# Patient Record
Sex: FEMALE | Race: OTHER | HISPANIC OR LATINO
[De-identification: names, ages, dates, MRNs, and addresses within clinical notes are randomized per-mention and may not be internally consistent; named-entity substitution may affect disease eponyms.]

---

## 2017-01-18 ENCOUNTER — RX RENEWAL (OUTPATIENT)
Age: 42
End: 2017-01-18

## 2017-03-16 ENCOUNTER — TRANSCRIPTION ENCOUNTER (OUTPATIENT)
Age: 42
End: 2017-03-16

## 2017-03-18 ENCOUNTER — TRANSCRIPTION ENCOUNTER (OUTPATIENT)
Age: 42
End: 2017-03-18

## 2017-03-22 ENCOUNTER — EMERGENCY (EMERGENCY)
Facility: HOSPITAL | Age: 42
LOS: 1 days | Discharge: HOME | End: 2017-03-22
Attending: EMERGENCY MEDICINE | Admitting: EMERGENCY MEDICINE
Payer: SELF-PAY

## 2017-03-22 VITALS
HEART RATE: 101 BPM | SYSTOLIC BLOOD PRESSURE: 184 MMHG | HEIGHT: 67 IN | OXYGEN SATURATION: 98 % | DIASTOLIC BLOOD PRESSURE: 103 MMHG | TEMPERATURE: 98 F | RESPIRATION RATE: 18 BRPM | WEIGHT: 184.97 LBS

## 2017-03-22 DIAGNOSIS — R05 COUGH: ICD-10-CM

## 2017-03-22 PROCEDURE — 71046 X-RAY EXAM CHEST 2 VIEWS: CPT

## 2017-03-22 PROCEDURE — 99284 EMERGENCY DEPT VISIT MOD MDM: CPT

## 2017-03-22 PROCEDURE — 71020: CPT | Mod: 26

## 2017-03-22 PROCEDURE — 99283 EMERGENCY DEPT VISIT LOW MDM: CPT | Mod: 25

## 2017-03-22 PROCEDURE — 94640 AIRWAY INHALATION TREATMENT: CPT

## 2017-03-22 RX ORDER — IPRATROPIUM/ALBUTEROL SULFATE 18-103MCG
3 AEROSOL WITH ADAPTER (GRAM) INHALATION ONCE
Qty: 0 | Refills: 0 | Status: COMPLETED | OUTPATIENT
Start: 2017-03-22 | End: 2017-03-22

## 2017-03-22 RX ADMIN — Medication 3 MILLILITER(S): at 12:14

## 2017-03-22 NOTE — ED PROVIDER NOTE - MEDICAL DECISION MAKING DETAILS
40 YO F p/w productive cough x 2 months, chest congestion but afebrile, Imm UTD, no recent travel, no red flags, possible post nasal drip vs poorly controlled bronchitis vs unlikely pertussis. Plan: duoneb, cxr, reassess and discharge. 42 YO F p/w productive cough x 2 months, chest congestion but afebrile, Imm UTD, no recent travel, no red flags, possible post nasal drip vs poorly controlled bronchitis vs unlikely pertussis. Plan: duoneb, cxr, reassess and discharge.       Attending note. Agree with the above. Chest x-ray is normal. Trial of albuterol. Follow up with allergist.

## 2017-03-22 NOTE — ED PROVIDER NOTE - OBJECTIVE STATEMENT
Pt is a 40 YO F w/ hx of anxiety and chronic urticaria, HTN p/w intermittent cough x 2 months, initially beginning w/ URI like symptoms of subj fevers and chills, nasal congestion, and violent coughing fits x 3- 4 times a day. Most symptoms have resolved but c/w persistent white clearish sputum productive cough, mild chest congestion but no SOB, no CP, no back pain, no SCHUMACHER, no swelling of LE. Pt has been seen by allergist w/ negative PFT, urgent care x 2 w/ prednisone w/ transient relief of symptoms, no other work-up. No previous episodes, has extensive smoking hx, no other worsening or relieving factors. Pt is a 42 YO F w/ hx of anxiety and chronic urticaria, HTN p/w intermittent cough x 2 months, initially beginning w/ URI like symptoms of subj fevers and chills, nasal congestion, and violent coughing fits x 3- 4 times a day. Most symptoms have resolved but c/w persistent white clearish sputum productive cough, mild chest congestion but no SOB, no CP, no back pain, no SCHUMACHER, no swelling of LE. Pt has been seen by allergist w/ negative PFT, urgent care x 2 w/ prednisone w/ transient relief of symptoms, no other work-up. No previous episodes, has extensive smoking hx, no other worsening or relieving factors.      Attending note. Patient was seen in the fast [facility]. Agree with above. Patient is complaining of a nonproductive cough for 2-3 months. Patient is complaining of some chest congestion but denies shortness of breath. Patient was seen by urgent care center and provided with a prescription for Flonase. Patient was subtly seen by her allergist who prescribed steroid. They also recommended she use a albuterol inhaler but patient was afraid to use it, because of concerns for tachycardia.  Patient was seen by urgent care and again 5 days ago where she was prescribed a Z-Rad. Patient has not been using the nasal spray or inhaler. Patient still occasionally smokes cigarettes. She denies any history of pneumonia or childhood asthma.

## 2017-03-22 NOTE — ED PROVIDER NOTE - PHYSICAL EXAMINATION
Attending note. Patient is alert and oriented in no acute distress. Oropharynx is normal. Neck is soft and nontender without adenopathy. Lungs are clear with good air entry bilaterally without wheezes, rales or rhonchi. Patient did not cough in my presence.

## 2017-03-22 NOTE — ED ADULT NURSE NOTE - OBJECTIVE STATEMENT
40 y/o female presents to ED after having persistent cough for 2 months. Pt had been placed on steroids and an antibiotic with no relief. Brings up clear sputum. Denies chest pain, SOB, N/V/D, fever or chills. A&Ox4, VSS, skin warm dry and intact, lungs CTA, MAEx4, ABD soft nondistended. Awaits further evaluation from MD.

## 2017-03-22 NOTE — ED PROVIDER NOTE - PROGRESS NOTE DETAILS
1300 - x-ray negative for acute PNA, will give f/u w/ pulm and to return w/ allergist, will discharge.

## 2017-03-30 ENCOUNTER — APPOINTMENT (OUTPATIENT)
Dept: INTERNAL MEDICINE | Facility: CLINIC | Age: 42
End: 2017-03-30

## 2017-03-30 ENCOUNTER — OUTPATIENT (OUTPATIENT)
Dept: OUTPATIENT SERVICES | Facility: HOSPITAL | Age: 42
LOS: 1 days | End: 2017-03-30
Payer: SELF-PAY

## 2017-03-30 VITALS — BODY MASS INDEX: 30.45 KG/M2 | HEIGHT: 67 IN | WEIGHT: 194 LBS | HEART RATE: 105 BPM

## 2017-03-30 DIAGNOSIS — I10 ESSENTIAL (PRIMARY) HYPERTENSION: ICD-10-CM

## 2017-03-30 PROCEDURE — G0463: CPT

## 2017-03-30 RX ORDER — ALPRAZOLAM 0.25 MG/1
0.25 TABLET ORAL
Qty: 20 | Refills: 0 | Status: ACTIVE | COMMUNITY
Start: 2017-03-30 | End: 1900-01-01

## 2017-03-31 DIAGNOSIS — F41.9 ANXIETY DISORDER, UNSPECIFIED: ICD-10-CM

## 2017-03-31 DIAGNOSIS — Z00.00 ENCOUNTER FOR GENERAL ADULT MEDICAL EXAMINATION WITHOUT ABNORMAL FINDINGS: ICD-10-CM

## 2017-04-05 ENCOUNTER — APPOINTMENT (OUTPATIENT)
Dept: OBGYN | Facility: CLINIC | Age: 42
End: 2017-04-05

## 2017-04-05 VITALS — HEIGHT: 67 IN | BODY MASS INDEX: 30.13 KG/M2 | WEIGHT: 192 LBS

## 2017-04-05 VITALS — SYSTOLIC BLOOD PRESSURE: 168 MMHG | DIASTOLIC BLOOD PRESSURE: 95 MMHG

## 2017-04-05 DIAGNOSIS — T81.9XXA UNSPECIFIED COMPLICATION OF PROCEDURE, INITIAL ENCOUNTER: ICD-10-CM

## 2017-04-05 DIAGNOSIS — Z87.891 PERSONAL HISTORY OF NICOTINE DEPENDENCE: ICD-10-CM

## 2017-04-05 DIAGNOSIS — Z80.49 FAMILY HISTORY OF MALIGNANT NEOPLASM OF OTHER GENITAL ORGANS: ICD-10-CM

## 2017-04-05 DIAGNOSIS — Z01.419 ENCOUNTER FOR GYNECOLOGICAL EXAMINATION (GENERAL) (ROUTINE) W/OUT ABNORMAL FINDINGS: ICD-10-CM

## 2017-04-05 DIAGNOSIS — L29.2 PRURITUS VULVAE: ICD-10-CM

## 2017-04-05 DIAGNOSIS — L50.8 OTHER URTICARIA: ICD-10-CM

## 2017-04-05 RX ORDER — CYCLOBENZAPRINE HYDROCHLORIDE 5 MG/1
5 TABLET, FILM COATED ORAL
Refills: 0 | Status: ACTIVE | COMMUNITY
Start: 2017-04-05

## 2017-04-06 LAB
CANDIDA VAG CYTO: NOT DETECTED
G VAGINALIS+PREV SP MTYP VAG QL MICRO: NOT DETECTED
T VAGINALIS VAG QL WET PREP: NOT DETECTED

## 2017-04-07 LAB — HPV HIGH+LOW RISK DNA PNL CVX: NEGATIVE

## 2017-04-11 LAB — CYTOLOGY CVX/VAG DOC THIN PREP: NORMAL

## 2017-04-25 ENCOUNTER — MEDICATION RENEWAL (OUTPATIENT)
Age: 42
End: 2017-04-25

## 2017-05-02 ENCOUNTER — APPOINTMENT (OUTPATIENT)
Dept: INTERNAL MEDICINE | Facility: CLINIC | Age: 42
End: 2017-05-02

## 2017-05-12 ENCOUNTER — APPOINTMENT (OUTPATIENT)
Dept: PULMONOLOGY | Facility: CLINIC | Age: 42
End: 2017-05-12

## 2017-05-17 ENCOUNTER — MEDICATION RENEWAL (OUTPATIENT)
Age: 42
End: 2017-05-17

## 2017-05-18 ENCOUNTER — TRANSCRIPTION ENCOUNTER (OUTPATIENT)
Age: 42
End: 2017-05-18

## 2017-05-22 ENCOUNTER — RX RENEWAL (OUTPATIENT)
Age: 42
End: 2017-05-22

## 2017-06-30 ENCOUNTER — APPOINTMENT (OUTPATIENT)
Dept: PULMONOLOGY | Facility: CLINIC | Age: 42
End: 2017-06-30

## 2017-07-06 ENCOUNTER — LABORATORY RESULT (OUTPATIENT)
Age: 42
End: 2017-07-06

## 2017-07-06 ENCOUNTER — OUTPATIENT (OUTPATIENT)
Dept: OUTPATIENT SERVICES | Facility: HOSPITAL | Age: 42
LOS: 1 days | End: 2017-07-06
Payer: SELF-PAY

## 2017-07-06 PROCEDURE — 83036 HEMOGLOBIN GLYCOSYLATED A1C: CPT

## 2017-07-06 PROCEDURE — 80061 LIPID PANEL: CPT

## 2017-07-06 PROCEDURE — 85027 COMPLETE CBC AUTOMATED: CPT

## 2017-07-06 PROCEDURE — 80053 COMPREHEN METABOLIC PANEL: CPT

## 2017-07-06 PROCEDURE — 84443 ASSAY THYROID STIM HORMONE: CPT

## 2017-07-07 DIAGNOSIS — I10 ESSENTIAL (PRIMARY) HYPERTENSION: ICD-10-CM

## 2017-07-07 DIAGNOSIS — F41.9 ANXIETY DISORDER, UNSPECIFIED: ICD-10-CM

## 2017-07-07 DIAGNOSIS — Z00.00 ENCOUNTER FOR GENERAL ADULT MEDICAL EXAMINATION WITHOUT ABNORMAL FINDINGS: ICD-10-CM

## 2017-07-07 LAB
ALBUMIN SERPL ELPH-MCNC: 4.4 G/DL — SIGNIFICANT CHANGE UP (ref 3.3–5)
ALP SERPL-CCNC: 105 U/L — SIGNIFICANT CHANGE UP (ref 40–120)
ALT FLD-CCNC: 16 U/L — SIGNIFICANT CHANGE UP (ref 10–45)
ANION GAP SERPL CALC-SCNC: 16 MMOL/L — SIGNIFICANT CHANGE UP (ref 5–17)
AST SERPL-CCNC: 16 U/L — SIGNIFICANT CHANGE UP (ref 10–40)
BILIRUB SERPL-MCNC: 0.4 MG/DL — SIGNIFICANT CHANGE UP (ref 0.2–1.2)
BUN SERPL-MCNC: 13 MG/DL — SIGNIFICANT CHANGE UP (ref 7–23)
CALCIUM SERPL-MCNC: 9.6 MG/DL — SIGNIFICANT CHANGE UP (ref 8.4–10.5)
CHLORIDE SERPL-SCNC: 102 MMOL/L — SIGNIFICANT CHANGE UP (ref 96–108)
CHOLEST SERPL-MCNC: 184 MG/DL — SIGNIFICANT CHANGE UP (ref 10–199)
CO2 SERPL-SCNC: 23 MMOL/L — SIGNIFICANT CHANGE UP (ref 22–31)
CREAT SERPL-MCNC: 0.93 MG/DL — SIGNIFICANT CHANGE UP (ref 0.5–1.3)
GLUCOSE SERPL-MCNC: 76 MG/DL — SIGNIFICANT CHANGE UP (ref 70–99)
HBA1C BLD-MCNC: 5.2 % — SIGNIFICANT CHANGE UP (ref 4–5.6)
HCT VFR BLD CALC: 47.4 % — HIGH (ref 34.5–45)
HDLC SERPL-MCNC: 44 MG/DL — SIGNIFICANT CHANGE UP (ref 40–125)
HGB BLD-MCNC: 15.2 G/DL — SIGNIFICANT CHANGE UP (ref 11.5–15.5)
LIPID PNL WITH DIRECT LDL SERPL: 91 MG/DL — SIGNIFICANT CHANGE UP
MCHC RBC-ENTMCNC: 31.1 PG — SIGNIFICANT CHANGE UP (ref 27–34)
MCHC RBC-ENTMCNC: 32.1 GM/DL — SIGNIFICANT CHANGE UP (ref 32–36)
MCV RBC AUTO: 96.9 FL — SIGNIFICANT CHANGE UP (ref 80–100)
PLATELET # BLD AUTO: 269 K/UL — SIGNIFICANT CHANGE UP (ref 150–400)
POTASSIUM SERPL-MCNC: 4.4 MMOL/L — SIGNIFICANT CHANGE UP (ref 3.5–5.3)
POTASSIUM SERPL-SCNC: 4.4 MMOL/L — SIGNIFICANT CHANGE UP (ref 3.5–5.3)
PROT SERPL-MCNC: 7.9 G/DL — SIGNIFICANT CHANGE UP (ref 6–8.3)
RBC # BLD: 4.89 M/UL — SIGNIFICANT CHANGE UP (ref 3.8–5.2)
RBC # FLD: 13.3 % — SIGNIFICANT CHANGE UP (ref 10.3–14.5)
SODIUM SERPL-SCNC: 141 MMOL/L — SIGNIFICANT CHANGE UP (ref 135–145)
T4 FREE+ TSH PNL SERPL: 1.38 UIU/ML — SIGNIFICANT CHANGE UP (ref 0.27–4.2)
TOTAL CHOLESTEROL/HDL RATIO MEASUREMENT: 4.2 RATIO — SIGNIFICANT CHANGE UP (ref 3.3–7.1)
TRIGL SERPL-MCNC: 243 MG/DL — HIGH (ref 10–149)
WBC # BLD: 10.73 K/UL — HIGH (ref 3.8–10.5)
WBC # FLD AUTO: 10.73 K/UL — HIGH (ref 3.8–10.5)

## 2017-07-26 ENCOUNTER — APPOINTMENT (OUTPATIENT)
Dept: OBGYN | Facility: CLINIC | Age: 42
End: 2017-07-26
Payer: COMMERCIAL

## 2017-07-26 VITALS
DIASTOLIC BLOOD PRESSURE: 90 MMHG | SYSTOLIC BLOOD PRESSURE: 160 MMHG | BODY MASS INDEX: 28.88 KG/M2 | HEIGHT: 67 IN | WEIGHT: 184 LBS

## 2017-07-26 DIAGNOSIS — N94.89 OTHER SPECIFIED CONDITIONS ASSOCIATED WITH FEMALE GENITAL ORGANS AND MENSTRUAL CYCLE: ICD-10-CM

## 2017-07-26 PROCEDURE — 76857 US EXAM PELVIC LIMITED: CPT

## 2017-07-26 PROCEDURE — 99214 OFFICE O/P EST MOD 30 MIN: CPT | Mod: 25

## 2017-07-26 RX ORDER — ASCORBIC ACID, CHOLECALCIFEROL, .ALPHA.-TOCOPHEROL ACETATE, DL-, PYRIDOXINE, FOLIC ACID, CYANOCOBALAMIN, CALCIUM, FERROUS FUMARATE, MAGNESIUM, DOCONEXENT 85; 200; 10; 25; 1; 12; 140; 27; 45; 300 [IU]/1; [IU]/1; [IU]/1; [IU]/1; MG/1; UG/1; MG/1; MG/1; MG/1; MG/1
27-0.6-0.4-3 CAPSULE, GELATIN COATED ORAL
Qty: 90 | Refills: 3 | Status: ACTIVE | COMMUNITY
Start: 2017-07-26 | End: 1900-01-01

## 2017-07-26 RX ORDER — DOXYLAMINE SUCCINATE AND PYRIDOXINE HYDROCHLORIDE 10; 10 MG/1; MG/1
10-10 TABLET, DELAYED RELEASE ORAL
Qty: 100 | Refills: 2 | Status: ACTIVE | COMMUNITY
Start: 2017-07-26 | End: 1900-01-01

## 2017-07-27 ENCOUNTER — LABORATORY RESULT (OUTPATIENT)
Age: 42
End: 2017-07-27

## 2017-07-28 LAB
C TRACH RRNA SPEC QL NAA+PROBE: NORMAL
HCG SERPL-MCNC: 3110 MIU/ML
N GONORRHOEA RRNA SPEC QL NAA+PROBE: NORMAL
PROGEST SERPL-MCNC: 5.3 NG/ML
SOURCE AMPLIFICATION: NORMAL

## 2017-08-03 DIAGNOSIS — Z3A.01 LESS THAN 8 WEEKS GESTATION OF PREGNANCY: ICD-10-CM

## 2017-08-10 PROBLEM — Z3A.01 LESS THAN 8 WEEKS GESTATION OF PREGNANCY: Status: ACTIVE | Noted: 2017-08-10

## 2017-08-16 DIAGNOSIS — E34.9 ENDOCRINE DISORDER, UNSPECIFIED: ICD-10-CM

## 2017-08-16 LAB — HCG SERPL-MCNC: 5134 MIU/ML

## 2017-08-17 PROBLEM — E34.9 ELEVATED SERUM HCG: Status: ACTIVE | Noted: 2017-08-15

## 2017-08-19 LAB — HCG SERPL-MCNC: 4538 MIU/ML

## 2017-08-25 LAB — HCG SERPL-MCNC: 71 MIU/ML

## 2017-09-06 LAB — HCG SERPL-MCNC: 1 MIU/ML

## 2017-09-13 ENCOUNTER — OTHER (OUTPATIENT)
Age: 42
End: 2017-09-13

## 2017-09-13 ENCOUNTER — TRANSCRIPTION ENCOUNTER (OUTPATIENT)
Age: 42
End: 2017-09-13

## 2017-09-13 DIAGNOSIS — R05 COUGH: ICD-10-CM

## 2017-09-13 RX ORDER — FLUTICASONE PROPIONATE 50 UG/1
50 SPRAY, METERED NASAL DAILY
Qty: 1 | Refills: 0 | Status: ACTIVE | COMMUNITY
Start: 2017-04-05 | End: 1900-01-01

## 2017-09-15 ENCOUNTER — OUTPATIENT (OUTPATIENT)
Dept: OUTPATIENT SERVICES | Facility: HOSPITAL | Age: 42
LOS: 1 days | End: 2017-09-15
Payer: SELF-PAY

## 2017-09-15 ENCOUNTER — APPOINTMENT (OUTPATIENT)
Dept: INTERNAL MEDICINE | Facility: CLINIC | Age: 42
End: 2017-09-15
Payer: COMMERCIAL

## 2017-09-15 VITALS — HEIGHT: 67 IN | WEIGHT: 187 LBS | BODY MASS INDEX: 29.35 KG/M2 | HEART RATE: 98 BPM | OXYGEN SATURATION: 98 %

## 2017-09-15 DIAGNOSIS — F41.9 ANXIETY DISORDER, UNSPECIFIED: ICD-10-CM

## 2017-09-15 DIAGNOSIS — I10 ESSENTIAL (PRIMARY) HYPERTENSION: ICD-10-CM

## 2017-09-15 DIAGNOSIS — Z87.09 PERSONAL HISTORY OF OTHER DISEASES OF THE RESPIRATORY SYSTEM: ICD-10-CM

## 2017-09-15 DIAGNOSIS — J06.9 ACUTE UPPER RESPIRATORY INFECTION, UNSPECIFIED: ICD-10-CM

## 2017-09-15 PROCEDURE — G0463: CPT

## 2017-09-15 PROCEDURE — 99213 OFFICE O/P EST LOW 20 MIN: CPT | Mod: GE

## 2017-09-17 RX ORDER — ALPRAZOLAM 0.25 MG/1
0.25 TABLET ORAL
Qty: 20 | Refills: 0 | Status: ACTIVE | COMMUNITY
Start: 2017-09-15 | End: 1900-01-01

## 2017-09-21 ENCOUNTER — APPOINTMENT (OUTPATIENT)
Dept: OBGYN | Facility: CLINIC | Age: 42
End: 2017-09-21

## 2017-10-11 ENCOUNTER — APPOINTMENT (OUTPATIENT)
Dept: OBGYN | Facility: CLINIC | Age: 42
End: 2017-10-11

## 2017-10-13 ENCOUNTER — MEDICATION RENEWAL (OUTPATIENT)
Age: 42
End: 2017-10-13

## 2017-10-13 RX ORDER — BENZONATATE 100 MG/1
100 CAPSULE ORAL
Qty: 40 | Refills: 0 | Status: ACTIVE | COMMUNITY
Start: 2017-05-18

## 2017-10-16 ENCOUNTER — MEDICATION RENEWAL (OUTPATIENT)
Age: 42
End: 2017-10-16

## 2017-10-19 ENCOUNTER — APPOINTMENT (OUTPATIENT)
Dept: OBGYN | Facility: CLINIC | Age: 42
End: 2017-10-19
Payer: COMMERCIAL

## 2017-10-19 VITALS — WEIGHT: 183 LBS | HEIGHT: 67 IN | BODY MASS INDEX: 28.72 KG/M2

## 2017-10-19 DIAGNOSIS — O03.9 COMPLETE OR UNSPECIFIED SPONTANEOUS ABORTION W/OUT COMPLICATION: ICD-10-CM

## 2017-10-19 DIAGNOSIS — Z00.00 ENCOUNTER FOR GENERAL ADULT MEDICAL EXAMINATION W/OUT ABNORMAL FINDINGS: ICD-10-CM

## 2017-10-19 LAB — HCG UR QL: NEGATIVE

## 2017-10-19 PROCEDURE — 99213 OFFICE O/P EST LOW 20 MIN: CPT

## 2017-10-19 PROCEDURE — 81025 URINE PREGNANCY TEST: CPT

## 2017-11-10 ENCOUNTER — TRANSCRIPTION ENCOUNTER (OUTPATIENT)
Age: 42
End: 2017-11-10

## 2018-02-03 ENCOUNTER — RX RENEWAL (OUTPATIENT)
Age: 43
End: 2018-02-03

## 2018-03-15 RX ORDER — BECLOMETHASONE DIPROPIONATE 80 UG/1
80 AEROSOL, METERED RESPIRATORY (INHALATION) TWICE DAILY
Qty: 1 | Refills: 1 | Status: ACTIVE | COMMUNITY
Start: 2017-05-18

## 2018-03-19 ENCOUNTER — MEDICATION RENEWAL (OUTPATIENT)
Age: 43
End: 2018-03-19

## 2018-04-13 RX ORDER — MONTELUKAST 10 MG/1
10 TABLET, FILM COATED ORAL DAILY
Qty: 90 | Refills: 3 | Status: ACTIVE | COMMUNITY
Start: 2017-09-15 | End: 1900-01-01

## 2018-04-23 ENCOUNTER — RX RENEWAL (OUTPATIENT)
Age: 43
End: 2018-04-23

## 2018-04-23 RX ORDER — IPRATROPIUM BROMIDE 17 UG/1
17 AEROSOL, METERED RESPIRATORY (INHALATION) 4 TIMES DAILY
Qty: 1 | Refills: 0 | Status: ACTIVE | COMMUNITY
Start: 2018-03-19 | End: 1900-01-01

## 2018-05-09 ENCOUNTER — RX RENEWAL (OUTPATIENT)
Age: 43
End: 2018-05-09

## 2018-05-09 RX ORDER — LORATADINE 5 MG/5 ML
10 SOLUTION, ORAL ORAL
Qty: 30 | Refills: 3 | Status: ACTIVE | COMMUNITY
Start: 2017-09-13 | End: 1900-01-01

## 2018-05-14 ENCOUNTER — RX RENEWAL (OUTPATIENT)
Age: 43
End: 2018-05-14

## 2018-05-14 RX ORDER — ALBUTEROL SULFATE 90 UG/1
108 (90 BASE) AEROSOL, METERED RESPIRATORY (INHALATION)
Qty: 54 | Refills: 0 | Status: ACTIVE | COMMUNITY
Start: 2017-04-05 | End: 1900-01-01

## 2018-10-31 ENCOUNTER — RX RENEWAL (OUTPATIENT)
Age: 43
End: 2018-10-31

## 2019-05-20 ENCOUNTER — RX RENEWAL (OUTPATIENT)
Age: 44
End: 2019-05-20

## 2020-06-16 ENCOUNTER — RX RENEWAL (OUTPATIENT)
Age: 45
End: 2020-06-16

## 2020-06-16 RX ORDER — LORATADINE 10 MG/1
10 TABLET ORAL
Qty: 90 | Refills: 3 | Status: ACTIVE | COMMUNITY
Start: 2018-10-31 | End: 1900-01-01

## 2020-12-15 PROBLEM — Z87.09 HISTORY OF UPPER RESPIRATORY INFECTION: Status: RESOLVED | Noted: 2017-10-03 | Resolved: 2020-12-15

## 2022-09-27 NOTE — ED PROVIDER NOTE - CARDIOVASCULAR NEGATIVE STATEMENT, MLM
Left pt message and sent her a note through My Chart.   normal rate and rhythm, no chest pain and no edema.

## 2024-04-17 NOTE — ED ADULT NURSE NOTE - NS ED NURSE DC INFO COMPLEXITY
Stable.  Check blood pressure outside of office.  Recommend lifestyle modifications.     Simple: Patient demonstrates quick and easy understanding